# Patient Record
Sex: FEMALE | URBAN - METROPOLITAN AREA
[De-identification: names, ages, dates, MRNs, and addresses within clinical notes are randomized per-mention and may not be internally consistent; named-entity substitution may affect disease eponyms.]

---

## 2017-10-13 ENCOUNTER — IMPORTED ENCOUNTER (OUTPATIENT)
Dept: URBAN - METROPOLITAN AREA CLINIC 9 | Facility: CLINIC | Age: 82
End: 2017-10-13

## 2019-06-13 ENCOUNTER — IMPORTED ENCOUNTER (OUTPATIENT)
Dept: URBAN - METROPOLITAN AREA CLINIC 9 | Facility: CLINIC | Age: 84
End: 2019-06-13

## 2021-10-18 ASSESSMENT — VISUAL ACUITY
OD_CC: 20/30 SN
OD_CC: 20/40 -2 SN
OS_CC: 20/25 SN
OS_CC: 20/25 - SN
OS_CC: 20/30 SN
OD_CC: 20/40 SN
OD_CC: 20/25 SN
OS_CC: 20/25 SN

## 2021-10-18 ASSESSMENT — TONOMETRY
OS_IOP_MMHG: 13
OS_IOP_MMHG: 10
OD_IOP_MMHG: 11
OD_IOP_MMHG: 13

## 2022-10-31 NOTE — PATIENT DISCUSSION
Continue f/u with Dr. Clau Vásquez; Educated pt that there is no improvement with new RX for OS and only minimal improvement in OD.  Pt understands but wants to replace glasses anyways.  Rec consult with Dr. Braxton Ramírez but pt declines, says she already saw her and there was nothing more that could be done.

## 2024-04-09 ENCOUNTER — CONTACT LENSES/GLASSES VISIT (OUTPATIENT)
Dept: URBAN - METROPOLITAN AREA CLINIC 16 | Facility: CLINIC | Age: 89
End: 2024-04-09

## 2024-04-09 DIAGNOSIS — H52.4: ICD-10-CM

## 2024-04-09 DIAGNOSIS — H52.223: ICD-10-CM

## 2024-04-09 PROCEDURE — 99211NC NO CHARGE VISIT: Mod: NC
